# Patient Record
Sex: FEMALE | Race: WHITE | NOT HISPANIC OR LATINO | Employment: OTHER | URBAN - METROPOLITAN AREA
[De-identification: names, ages, dates, MRNs, and addresses within clinical notes are randomized per-mention and may not be internally consistent; named-entity substitution may affect disease eponyms.]

---

## 2018-12-03 PROBLEM — E86.0 DEHYDRATION: Status: ACTIVE | Noted: 2018-12-03

## 2018-12-03 PROBLEM — F32.A DEPRESSION: Status: ACTIVE | Noted: 2018-12-03

## 2018-12-03 PROBLEM — R42 DIZZINESS: Status: ACTIVE | Noted: 2018-12-03

## 2021-01-12 ENCOUNTER — IMMUNIZATION (OUTPATIENT)
Dept: FAMILY MEDICINE | Facility: CLINIC | Age: 86
End: 2021-01-12
Payer: MEDICARE

## 2021-01-12 DIAGNOSIS — Z23 NEED FOR VACCINATION: ICD-10-CM

## 2021-01-12 PROCEDURE — 99999 COVID-19, MRNA, LNP-S, PF, 30 MCG/0.3 ML DOSE VACCINE: ICD-10-PCS | Mod: PBBFAC,,,

## 2021-01-12 PROCEDURE — 99999 COVID-19, MRNA, LNP-S, PF, 30 MCG/0.3 ML DOSE VACCINE: CPT | Mod: PBBFAC,,,

## 2021-01-12 PROCEDURE — 91300 COVID-19, MRNA, LNP-S, PF, 30 MCG/0.3 ML DOSE VACCINE: CPT | Performed by: FAMILY MEDICINE

## 2021-01-12 PROCEDURE — 91300 COVID-19, MRNA, LNP-S, PF, 30 MCG/0.3 ML DOSE VACCINE: CPT | Mod: PBBFAC

## 2021-02-02 ENCOUNTER — IMMUNIZATION (OUTPATIENT)
Dept: FAMILY MEDICINE | Facility: CLINIC | Age: 86
End: 2021-02-02
Payer: MEDICARE

## 2021-02-02 DIAGNOSIS — Z23 NEED FOR VACCINATION: Primary | ICD-10-CM

## 2021-02-02 PROCEDURE — 91300 COVID-19, MRNA, LNP-S, PF, 30 MCG/0.3 ML DOSE VACCINE: CPT | Mod: PBBFAC | Performed by: FAMILY MEDICINE

## 2021-02-02 PROCEDURE — 0002A COVID-19, MRNA, LNP-S, PF, 30 MCG/0.3 ML DOSE VACCINE: CPT | Mod: PBBFAC | Performed by: FAMILY MEDICINE

## 2021-02-18 PROBLEM — S72.002A CLOSED FRACTURE OF LEFT HIP: Status: ACTIVE | Noted: 2021-02-18

## 2021-02-19 PROBLEM — N17.9 AKI (ACUTE KIDNEY INJURY): Status: ACTIVE | Noted: 2021-02-19

## 2021-02-22 PROBLEM — S62.102A LEFT WRIST FRACTURE: Status: ACTIVE | Noted: 2021-02-22

## 2021-02-22 PROBLEM — N39.0 ACUTE UTI: Status: ACTIVE | Noted: 2021-02-22

## 2021-02-23 PROBLEM — J18.9 RIGHT LOWER LOBE PNEUMONIA: Status: ACTIVE | Noted: 2021-02-23

## 2022-03-06 PROBLEM — J81.1 PULMONARY EDEMA: Status: ACTIVE | Noted: 2022-03-06

## 2022-03-06 PROBLEM — I21.4 NSTEMI (NON-ST ELEVATED MYOCARDIAL INFARCTION): Status: ACTIVE | Noted: 2022-03-06

## 2022-03-07 PROBLEM — N39.0 ACUTE UTI: Status: RESOLVED | Noted: 2021-02-22 | Resolved: 2022-03-07

## 2022-03-07 PROBLEM — R53.81 PHYSICAL DECONDITIONING: Status: ACTIVE | Noted: 2022-03-07

## 2022-03-07 PROBLEM — J18.9 RIGHT LOWER LOBE PNEUMONIA: Status: RESOLVED | Noted: 2021-02-23 | Resolved: 2022-03-07

## 2022-03-07 PROBLEM — I50.9 ACUTE DECOMPENSATED HEART FAILURE: Status: ACTIVE | Noted: 2022-03-06

## 2022-03-09 PROBLEM — I50.9 ACUTE DECOMPENSATED HEART FAILURE: Status: RESOLVED | Noted: 2022-03-06 | Resolved: 2022-03-09

## 2022-03-09 PROBLEM — R53.1 WEAKNESS GENERALIZED: Status: ACTIVE | Noted: 2022-03-09

## 2022-03-09 PROBLEM — I21.4 NSTEMI (NON-ST ELEVATED MYOCARDIAL INFARCTION): Status: RESOLVED | Noted: 2022-03-06 | Resolved: 2022-03-09

## 2022-03-10 PROBLEM — E43 SEVERE PROTEIN-CALORIE MALNUTRITION: Status: ACTIVE | Noted: 2022-03-10

## 2022-03-17 PROBLEM — I10 HYPERTENSION, ESSENTIAL: Status: ACTIVE | Noted: 2022-03-17

## 2022-10-08 ENCOUNTER — HOSPITAL ENCOUNTER (EMERGENCY)
Facility: HOSPITAL | Age: 87
Discharge: HOME | End: 2022-10-08
Attending: SURGERY
Payer: MEDICARE

## 2022-10-08 VITALS
SYSTOLIC BLOOD PRESSURE: 118 MMHG | DIASTOLIC BLOOD PRESSURE: 70 MMHG | RESPIRATION RATE: 20 BRPM | TEMPERATURE: 97 F | WEIGHT: 87.31 LBS | HEART RATE: 65 BPM | OXYGEN SATURATION: 97 % | BODY MASS INDEX: 15.46 KG/M2

## 2022-10-08 DIAGNOSIS — S01.01XA LACERATION OF SCALP, INITIAL ENCOUNTER: ICD-10-CM

## 2022-10-08 DIAGNOSIS — S09.90XA INJURY OF HEAD, INITIAL ENCOUNTER: Primary | ICD-10-CM

## 2022-10-08 PROCEDURE — 25000003 PHARM REV CODE 250: Performed by: SURGERY

## 2022-10-08 PROCEDURE — 12002 RPR S/N/AX/GEN/TRNK2.6-7.5CM: CPT

## 2022-10-08 PROCEDURE — 63600175 PHARM REV CODE 636 W HCPCS: Performed by: SURGERY

## 2022-10-08 PROCEDURE — 90715 TDAP VACCINE 7 YRS/> IM: CPT | Performed by: SURGERY

## 2022-10-08 PROCEDURE — 99285 EMERGENCY DEPT VISIT HI MDM: CPT | Mod: 25

## 2022-10-08 PROCEDURE — 90471 IMMUNIZATION ADMIN: CPT | Performed by: SURGERY

## 2022-10-08 RX ORDER — MUPIROCIN 20 MG/G
1 OINTMENT TOPICAL
Status: COMPLETED | OUTPATIENT
Start: 2022-10-08 | End: 2022-10-08

## 2022-10-08 RX ADMIN — MUPIROCIN 22 G: 20 OINTMENT TOPICAL at 03:10

## 2022-10-08 RX ADMIN — TETANUS TOXOID, REDUCED DIPHTHERIA TOXOID AND ACELLULAR PERTUSSIS VACCINE, ADSORBED 0.5 ML: 5; 2.5; 8; 8; 2.5 SUSPENSION INTRAMUSCULAR at 03:10

## 2022-10-08 NOTE — ED TRIAGE NOTES
89 y.o. female presents to ER ED 01/ED 01A   Chief Complaint   Patient presents with    Fall     Patient presents via Ambulance with C/O fall, she has a laceration to top of head and multiple skin tears to right forearm   . No acute distress noted.  Patient is on Plavix

## 2022-10-08 NOTE — ED PROVIDER NOTES
Encounter Date: 10/8/2022       History     Chief Complaint   Patient presents with    Fall     Patient presents via Ambulance with C/O fall, she has a laceration to top of head and multiple skin tears to right forearm     89-year-old female presents with a head laceration today in ER  Patient with accidental fall at home with a 3 centimeter laceration  Patient has no active bleeding but is on Plavix per interview now  Completely normal neurologic exam, son at bedside in the ER  Patient only has a complaint of mild headache, GCS 15 on triage    Review of patient's allergies indicates:   Allergen Reactions    Codeine      Past Medical History:   Diagnosis Date    High cholesterol     Hypothyroid     MI (myocardial infarction)     Neuropathy     Thyroid disease      Past Surgical History:   Procedure Laterality Date    CARDIAC SURGERY      CHOLECYSTECTOMY      CORONARY ARTERY BYPASS GRAFT      HYSTERECTOMY      ORIF FEMUR FRACTURE Left 2/19/2021    Procedure: ORIF, FRACTURE, FEMUR;  Surgeon: Barak Bui MD;  Location: [facility];  Service: Orthopedics;  Laterality: Left;    THYROIDECTOMY      TONSILLECTOMY       No family history on file.  Social History     Tobacco Use    Smoking status: Never     Review of Systems   Constitutional: Negative.    HENT: Negative.     Eyes: Negative.    Respiratory: Negative.     Cardiovascular: Negative.    Gastrointestinal: Negative.    Genitourinary: Negative.    Musculoskeletal: Negative.    Skin: Negative.    Neurological:         (+) headache   Psychiatric/Behavioral: Negative.       Physical Exam     Initial Vitals [10/08/22 1340]   BP Pulse Resp Temp SpO2   136/66 61 18 96.9 °F (36.1 °C) 99 %      MAP       --         Physical Exam    Nursing note and vitals reviewed.  Constitutional: Vital signs are normal. She appears well-developed and well-nourished. She is cooperative.   HENT:   Head: Normocephalic and atraumatic.   Right Ear: External ear normal.   Left Ear: External ear  normal.   Nose: Nose normal.   Mouth/Throat: Oropharynx is clear and moist.   (+) 3 centimeter laceration horizontally on scalp   Eyes: Conjunctivae, EOM and lids are normal. Pupils are equal, round, and reactive to light.   Neck: Trachea normal and phonation normal. Neck supple. No JVD present.   Normal range of motion.   Full passive range of motion without pain.     Cardiovascular:  Normal rate, regular rhythm, S1 normal, S2 normal, normal heart sounds, intact distal pulses and normal pulses.           Pulmonary/Chest: Effort normal and breath sounds normal.   Abdominal: Abdomen is soft and flat. Bowel sounds are normal.   Musculoskeletal:         General: Normal range of motion.      Cervical back: Full passive range of motion without pain, normal range of motion and neck supple.     Neurological: She is alert and oriented to person, place, and time. She has normal strength.   Skin: Skin is warm, dry and intact. Capillary refill takes less than 2 seconds.       ED Course   Procedures  Labs Reviewed - No data to display       Imaging Results              CT Head Without Contrast (Final result)  Result time 10/08/22 14:30:44      Final result by Anali Ba MD (10/08/22 14:30:44)                   Impression:      No acute post-traumatic intracranial abnormality appreciated.  Specifically, no acute intracranial hemorrhage appreciated.  No interval detrimental change in the imaging appearance of the brain when compared to the previous study.      Electronically signed by: Delmer Ba MD  Date:    10/08/2022  Time:    14:30               Narrative:    EXAMINATION:  CT HEAD WITHOUT CONTRAST    CLINICAL HISTORY:  Facial trauma, penetrating;Facial trauma, blunt;    TECHNIQUE:  5 mm low dose non-contrast contiguous axial images were acquired through the head.  Subsequently, 2 dimensional coronal and sagittal reconstructed images were generated from the source data.    COMPARISON:  CT head without  contrast-02/18/2021    FINDINGS:  The brain is normally formed with preserved gray-white matter junction differentiation. No evidence of acute/recent major vascular territory cerebral infarction, parenchymal hemorrhage, or intra-axial mass.  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  There is compensatory enlargement of the left lateral ventricle.  There are confluent areas of periventricular white matter hypoattenuation compatible with age-appropriate chronic microvascular ischemic changes.  There is a focus of remote lacunar infarction present within the right cerebellar hemisphere, unchanged when compared to the prior study.  There is a parenchymal calcification present within the subcortical white matter of the left corona radiata on and the left frontal lobe, unchanged.    No hydrocephalus.  No effacement of the skull-base cisterns.  No extra-axial fluid collections or blood products.    The paranasal sinuses and mastoid air cells are clear.  There is leftward bony nasal septal deviation.  There are probable postoperative changes of the lenses which suggest prior cataract surgery.  The bony calvarium and visualized facial bones show no acute abnormality.                                       Medications   Tdap (BOOSTRIX) vaccine injection 0.5 mL (has no administration in time range)   mupirocin 2 % ointment 22 g (has no administration in time range)     Laceration Repair  -- Performed by: SCOTT DE JESUS  -- Consent Done: Emergent Situation  -- Body area:  Scalp  -- Laceration length:  3 centimeter  -- Tendon involvement: none  -- Nerve involvement: none  -- Vascular damage: none  -- Amount of cleaning: standard  -- Skin closure: 5 staples  -- Approximation difficulty: simple  -- Dressing: antibiotic ointment     Medical Decision Making:   Initial Assessment:   Slip and  fall at home with a scalp laceration today  Alert appropriate with only complaint of headache today  Patient has had neurologic baseline per signs history today    Differential Diagnosis:   Closed head injury, concussion, scalp laceration, skull fracture, SAH/ICH    Clinical Tests:   Radiological Study: Ordered and Reviewed    ED Management:  Wound irrigated clean with hydrogen peroxide, staple closure  Negative CT of the head, patient is at neurologic baseline the ER today  Wound dressed with Bactroban with updated tetanus status in the ER today  Tylenol at home for pain with staple removal next 7 days as an outpatient  Return to the ER with any concerning signs symptoms after discharge                         Clinical Impression:   Final diagnoses:  [S09.90XA] Injury of head, initial encounter (Primary)  [S01.01XA] Laceration of scalp, initial encounter        ED Disposition Condition    Discharge Stable          ED Prescriptions    None       Follow-up Information       Follow up With Specialties Details Why Contact Info    Braulio Harper MD Family Medicine Go in 1 week  [address]  [facility]  [place] [address]  [phone]               Timoteo James MD  10/08/22 [id number]

## 2024-02-08 PROBLEM — I50.21 ACUTE HFREF (HEART FAILURE WITH REDUCED EJECTION FRACTION): Status: ACTIVE | Noted: 2022-03-06

## 2024-02-08 PROBLEM — I50.23 ACUTE ON CHRONIC HFREF (HEART FAILURE WITH REDUCED EJECTION FRACTION): Status: ACTIVE | Noted: 2022-03-06

## 2024-02-08 PROBLEM — E03.9 HYPOTHYROIDISM: Status: ACTIVE | Noted: 2024-02-08

## 2024-02-09 PROBLEM — I50.9 CONGESTIVE HEART FAILURE: Status: ACTIVE | Noted: 2024-02-09

## 2024-02-10 PROBLEM — E03.9 HYPOTHYROIDISM: Chronic | Status: ACTIVE | Noted: 2024-02-08

## 2024-02-10 PROBLEM — I50.9 CONGESTIVE HEART FAILURE: Status: RESOLVED | Noted: 2024-02-09 | Resolved: 2024-02-10
